# Patient Record
Sex: FEMALE | Race: WHITE | NOT HISPANIC OR LATINO | URBAN - METROPOLITAN AREA
[De-identification: names, ages, dates, MRNs, and addresses within clinical notes are randomized per-mention and may not be internally consistent; named-entity substitution may affect disease eponyms.]

---

## 2023-12-01 ENCOUNTER — LAB (OUTPATIENT)
Dept: LAB | Facility: LAB | Age: 48
End: 2023-12-01
Payer: COMMERCIAL

## 2023-12-01 DIAGNOSIS — T78.2XXA ANAPHYLACTIC SHOCK, UNSPECIFIED, INITIAL ENCOUNTER: ICD-10-CM

## 2023-12-01 DIAGNOSIS — T78.2XXA ANAPHYLACTIC SHOCK, UNSPECIFIED, INITIAL ENCOUNTER: Primary | ICD-10-CM

## 2023-12-01 PROCEDURE — 84146 ASSAY OF PROLACTIN: CPT

## 2023-12-01 PROCEDURE — 84150 ASSAY OF PROSTAGLANDIN: CPT

## 2023-12-01 PROCEDURE — 36415 COLL VENOUS BLD VENIPUNCTURE: CPT

## 2023-12-01 PROCEDURE — 82570 ASSAY OF URINE CREATININE: CPT

## 2023-12-01 PROCEDURE — 86160 COMPLEMENT ANTIGEN: CPT

## 2023-12-01 PROCEDURE — 82542 COL CHROMOTOGRAPHY QUAL/QUAN: CPT

## 2023-12-01 PROCEDURE — 83520 IMMUNOASSAY QUANT NOS NONAB: CPT

## 2023-12-02 LAB — C4 SERPL-MCNC: 29 MG/DL (ref 10–50)

## 2023-12-04 LAB — TRYPTASE SERPL-MCNC: 7.4 UG/L

## 2023-12-06 LAB
2,3-DINOR 11B-PG F2A/CREAT UR: 1688 PG/MG CR
CREAT UR-MCNC: 26 MG/DL (ref 16–326)

## 2023-12-11 LAB
CREAT UR-MCNC: 26 MG/DL (ref 16–326)
LTE4/CREAT UR: 96 PG/MG CR

## 2023-12-13 LAB — SCAN RESULT: NORMAL

## 2024-03-20 ENCOUNTER — TELEPHONE (OUTPATIENT)
Dept: PRIMARY CARE | Facility: CLINIC | Age: 49
End: 2024-03-20

## 2024-03-20 NOTE — TELEPHONE ENCOUNTER
Patient called to make appt with you, would be new. Said that you know her personally was ok for her to establish. Just want to be certain before I make anything